# Patient Record
Sex: MALE | NOT HISPANIC OR LATINO | URBAN - METROPOLITAN AREA
[De-identification: names, ages, dates, MRNs, and addresses within clinical notes are randomized per-mention and may not be internally consistent; named-entity substitution may affect disease eponyms.]

---

## 2021-09-20 ENCOUNTER — TRANSFERRED RECORDS (OUTPATIENT)
Dept: HEALTH INFORMATION MANAGEMENT | Facility: CLINIC | Age: 66
End: 2021-09-20

## 2021-09-27 ENCOUNTER — MEDICAL CORRESPONDENCE (OUTPATIENT)
Dept: HEALTH INFORMATION MANAGEMENT | Facility: CLINIC | Age: 66
End: 2021-09-27

## 2021-09-27 ENCOUNTER — TRANSCRIBE ORDERS (OUTPATIENT)
Dept: OTHER | Age: 66
End: 2021-09-27

## 2021-09-27 DIAGNOSIS — E80.1 PORPHYRIA CUTANEA TARDA (H): Primary | ICD-10-CM

## 2021-11-08 ENCOUNTER — TELEPHONE (OUTPATIENT)
Dept: DERMATOLOGY | Facility: CLINIC | Age: 66
End: 2021-11-08
Payer: COMMERCIAL

## 2021-11-08 NOTE — TELEPHONE ENCOUNTER
[facility] Call Center    Phone Message    May a detailed message be left on voicemail: no     Reason for Call: Appointment Intake    Referring Provider Name:   Referral from [facility], Leesa Connolly  P: [phone]  F: [phone]    Diagnosis and/or Symptoms  biopsy proven porphyria cutanea tarda    Action Taken: Message routed to:  [facility] ([facility] Derm    Travel Screening: Not Applicable     Referral Dx not on Protocols, please call Pt to advise if they can be scheduled. Thanks!

## 2021-11-22 ENCOUNTER — TELEPHONE (OUTPATIENT)
Dept: DERMATOLOGY | Facility: CLINIC | Age: 66
End: 2021-11-22
Payer: COMMERCIAL

## 2021-11-22 NOTE — TELEPHONE ENCOUNTER
Spoke to patient / declined to schedule stating that he didn't need to schedule, he doesn't have symptoms in the winter     [facility] on 11/22/2021 at 12:16 PM